# Patient Record
(demographics unavailable — no encounter records)

---

## 2024-11-25 NOTE — DISCUSSION/SUMMARY
[EKG obtained to assist in diagnosis and management of assessed problem(s)] : EKG obtained to assist in diagnosis and management of assessed problem(s) [FreeTextEntry1] : Pt is a 71 y/o F PMH CAD s/p PCI (taxus) to RCA/PDA 2006 with Dr Patterson ("tapping sensation" in her chest with exertion), HLD, HTN, preDM  CAD: pt asymptomatic unknown LV function Will check transthoracic echocardiogram to evaluate left ventricular function and assess for any structural abnormalities  c/w ASA 81mg qd and plavix (has been on DAPT since PCI, will continue) increase crestor to 40mg qd, goal LDL <70 We discussed the importance of aggressive risk factor modification, including continuing medications as directed, following a healthy diet of unprocessed, low saturated fat and carbohydrate diet as close to a plant based or Mediterranean as possible, regular exercise at least 30 minutes of cardiovascular exercise daily. Also advised to report any symptoms immediately. will check nuc stress test   HTN: well controlled c/w ramipril 10mg qd c/w coreg 6.25mg bid Discussed the long-term health risks of uncontrolled BP.  Advised low salt diet, regular exercise, maintaining ideal weight. Encouraged pt to check BP at home and keep journal   Most recent available lab results were reviewed with pt. Pt will return in 6 mnths or sooner as needed but I encouraged communication via phone or portal if necessary.  We will call pt with test results when applicable and arrange for expedited follow up if necessary.  The described plan was discussed with the pt.  All questions and concerns were addressed to the best of my knowledge.

## 2024-11-25 NOTE — HISTORY OF PRESENT ILLNESS
[FreeTextEntry1] : Pt is a 69 y/o F who presents today for initial evaluation.  Pt has PMH CAD s/p PCI (taxus) to RCA/PDA 2006 with Dr Patterson ("tapping sensation" in her chest with exertion), HLD, HTN, preDM She is feeling well overall - denies CP, SOB, diaphoresis, palpitations, dizziness, syncope, LE edema, PND, orthopnea.    Nuc stress test 11/2020 normal myocardial perfusion, EF 69%  lipitor caused myalgias Previous surgeries: carpal tunnel - no problems with anesthesia Smoking status: never social ETOH no drug use Current exercise: walking 2x/week  Daily water intake: 32 oz Daily caffeine intake: 1 cup coffee 3 children - no problem with pregnancies